# Patient Record
Sex: MALE
[De-identification: names, ages, dates, MRNs, and addresses within clinical notes are randomized per-mention and may not be internally consistent; named-entity substitution may affect disease eponyms.]

---

## 2019-08-06 ENCOUNTER — HOSPITAL ENCOUNTER (EMERGENCY)
Dept: HOSPITAL 5 - ED | Age: 34
Discharge: HOME | End: 2019-08-06
Payer: SELF-PAY

## 2019-08-06 VITALS — DIASTOLIC BLOOD PRESSURE: 83 MMHG | SYSTOLIC BLOOD PRESSURE: 127 MMHG

## 2019-08-06 DIAGNOSIS — B96.89: ICD-10-CM

## 2019-08-06 DIAGNOSIS — F17.200: ICD-10-CM

## 2019-08-06 DIAGNOSIS — Z79.899: ICD-10-CM

## 2019-08-06 DIAGNOSIS — J03.80: Primary | ICD-10-CM

## 2019-08-06 PROCEDURE — 87430 STREP A AG IA: CPT

## 2019-08-06 PROCEDURE — 87116 MYCOBACTERIA CULTURE: CPT

## 2019-08-06 PROCEDURE — 99283 EMERGENCY DEPT VISIT LOW MDM: CPT

## 2019-08-06 PROCEDURE — 96372 THER/PROPH/DIAG INJ SC/IM: CPT

## 2019-08-06 NOTE — EMERGENCY DEPARTMENT REPORT
ED General Adult HPI





- General


Chief complaint: Sore Throat


Stated complaint: SEVERE SORE THROAT


Time Seen by Provider: 08/06/19 16:54


Source: patient


Mode of arrival: Ambulatory


Limitations: No Limitations





- History of Present Illness


Initial comments: 





Patient is a 33-year-old -American male with no past medical history who 

presents to the ED with c/o acute onset persistent severe sore throat, 

dysphagia, diffuse body aches and pain, lack of appetite and generalized 

weakness for the last 3 days.  Patient states that he has not been able to eat 

anything because of severe sore throat especially in the last 24 hours.  Patient

also complains of fever, chills, headache, nasal and sinus congestion, shortness

of breath and cough.  Patient denies abdominal pain, diarrhea, dysuria, urinary 

frequency and urgency, change in vision, neck pain, palpitations, low back pain.


MD Complaint: Sore throat, fever, chills, body aches and pain


-: Sudden, days(s) (3)


Location: head, mouth


Radiation: non-radiation


Severity scale (0 -10): 8


Quality: aching, sharp


Consistency: constant


Improves with: none


Worsens with: none


Associated Symptoms: denies other symptoms, cough, fever/chills, headaches, loss

of appetite, malaise.  denies: confusion, chest pain, diaphoresis, 

nausea/vomiting, rash, seizure, shortness of breath, syncope, weakness


Treatments Prior to Arrival: NSAID





- Related Data


                                  Previous Rx's











 Medication  Instructions  Recorded  Last Taken  Type


 


Amoxicillin [Trimox CAP] 500 mg PO Q8H #30 capsule 08/06/19 Unknown Rx


 


Ibuprofen [Motrin] 600 mg PO Q8H PRN #24 tablet 08/06/19 Unknown Rx


 


Lidocaine Viscous 2% 10 ml PO Q4H PRN #120 ml 08/06/19 Unknown Rx


 


methylPREDNISolone [Medrol 4MG 4 mg PO DAILY #21 tab.ds.pk 08/06/19 Unknown Rx





DOSEPAK (21 tabs)]    











                                    Allergies











Allergy/AdvReac Type Severity Reaction Status Date / Time


 


No Known Allergies Allergy   Unverified 08/06/19 16:19














ED Review of Systems


ROS: 


Stated complaint: SEVERE SORE THROAT


Other details as noted in HPI





Constitutional: chills, fever, malaise


Eyes: denies: eye pain, eye discharge, vision change


ENT: throat pain, congestion.  denies: ear pain, dental pain, hearing loss, 

epistaxis


Respiratory: no symptoms reported, cough.  denies: shortness of breath, SOB with

exertion, wheezing


Cardiovascular: denies: chest pain, palpitations


Endocrine: no symptoms reported


Gastrointestinal: denies: abdominal pain, nausea, diarrhea


Genitourinary: denies: urgency, dysuria


Musculoskeletal: denies: back pain, joint swelling, arthralgia


Skin: denies: rash, lesions


Neurological: denies: headache, weakness, paresthesias


Psychiatric: denies: anxiety, depression


Hematological/Lymphatic: denies: easy bleeding, easy bruising





ED Past Medical Hx





- Past Medical History


Previous Medical History?: No





- Surgical History


Past Surgical History?: No





- Social History


Smoking Status: Current Every Day Smoker


Substance Use Type: None





- Medications


Home Medications: 


                                Home Medications











 Medication  Instructions  Recorded  Confirmed  Last Taken  Type


 


Amoxicillin [Trimox CAP] 500 mg PO Q8H #30 capsule 08/06/19  Unknown Rx


 


Ibuprofen [Motrin] 600 mg PO Q8H PRN #24 tablet 08/06/19  Unknown Rx


 


Lidocaine Viscous 2% 10 ml PO Q4H PRN #120 ml 08/06/19  Unknown Rx


 


methylPREDNISolone [Medrol 4MG 4 mg PO DAILY #21 tab.ds.pk 08/06/19  Unknown Rx





DOSEPAK (21 tabs)]     














ED Physical Exam





- General


Limitations: No Limitations


General appearance: alert, in no apparent distress





- Head


Head exam: Present: atraumatic, normocephalic, normal inspection





- Eye


Eye exam: Present: normal appearance, PERRL, EOMI.  Absent: scleral icterus, 

conjunctival injection, nystagmus, periorbital swelling, periorbital tenderness


Pupils: Present: normal accommodation





- ENT


ENT exam: Present: mucous membranes moist, TM's normal bilaterally, normal 

external ear exam, other (Grossly congested nasal passages; Erythematous swollen

tonsils and oropharynx)





- Neck


Neck exam: Present: normal inspection, full ROM, lymphadenopathy.  Absent: 

tenderness





- Respiratory


Respiratory exam: Present: normal lung sounds bilaterally.  Absent: respiratory 

distress, wheezes, rales, rhonchi, chest wall tenderness, accessory muscle use





- Cardiovascular


Cardiovascular Exam: Present: regular rate, normal rhythm, normal heart sounds. 

Absent: systolic murmur, diastolic murmur, rubs, gallop





- GI/Abdominal


GI/Abdominal exam: Present: soft, normal bowel sounds.  Absent: distended, 

tenderness, rebound, hyperactive bowel sounds, hypoactive bowel sounds, 

organomegaly





- Rectal


Rectal exam: Present: deferred





- Extremities Exam


Extremities exam: Present: normal inspection, full ROM, normal capillary refill





- Back Exam


Back exam: Present: normal inspection, full ROM.  Absent: CVA tenderness (L), 

muscle spasm, paraspinal tenderness





- Neurological Exam


Neurological exam: Present: alert, oriented X3, CN II-XII intact, normal gait, 

reflexes normal





- Psychiatric


Psychiatric exam: Present: normal affect, normal mood





- Skin


Skin exam: Present: warm, dry, intact, normal color.  Absent: rash





ED Course


                                   Vital Signs











  08/06/19





  16:52


 


Temperature 101.6 F H


 


Pulse Rate 93 H


 


Respiratory 18





Rate 


 


Blood Pressure 127/83


 


O2 Sat by Pulse 99





Oximetry 














- Reevaluation(s)


Reevaluation #1: 





08/06/19 20:57


This is 33-year-old male who presented to the ED with acute onset study a sore 

throat with dysphagia, nasal and sinus congestion, fever and chills and mild dry

cough for the last 3 days.  In the ED, patient patient is alert and oriented 3 

and is not in distress but appears to be in pain and is febrile.  Rapid strep 

test is negative.  Patient was treated for pain in the ED as well as fever.  

Patient also received Bicillin 1.2 million units intramuscular injection 

empirically as well as Decadron.  On reevaluation, patient's fever is well 

controlled with medications and patient is feeling better.  Patient is 

discharged home on medications and advised follow-up with his primary care 

physician in 5-7 days for reevaluation or return to the ED immediately if 

symptoms get worse.


08/06/19 20:59








ED Medical Decision Making





- Medical Decision Making





This is 33-year-old male who presented to the ED with acute onset study a sore 

throat with dysphagia, nasal and sinus congestion, fever and chills and mild dry

cough for the last 3 days.  In the ED, patient patient is alert and oriented 3 

and is not in distress but appears to be in pain and is febrile.  Rapid strep 

test is negative.  Patient was treated for pain in the ED as well as fever.  

Patient also received Bicillin 1.2 million units intramuscular injection 

empirically as well as Decadron.  On reevaluation, patient's fever is well 

controlled with medications and patient is feeling better.  Patient is 

discharged home on medications and advised follow-up with his primary care 

physician in 5-7 days for reevaluation or return to the ED immediately if 

symptoms get worse.





- Differential Diagnosis


Strep pharyngitis; fever and chills, acute URI


Critical care attestation.: 


If time is entered above; I have spent that time in minutes in the direct care 

of this critically ill patient, excluding procedure time.








ED Disposition


Clinical Impression: 


 Acute bacterial tonsillitis, Fever and chills, Acute upper respiratory 

infection





Acute pharyngitis


Qualifiers:


 Pharyngitis/tonsillitis etiology: other specified organisms Qualified Code(s): 

J02.8 - Acute pharyngitis due to other specified organisms





Disposition: DC-01 TO HOME OR SELFCARE


Is pt being admited?: No


Does the pt Need Aspirin: No


Condition: Stable


Instructions:  Pharyngitis (ED), Fever in Adults (ED), Upper Respiratory 

Infection (ED), Tonsillitis (ED)


Additional Instructions: 


Take medications with food, drink plenty of fluids and follow-up with your 

primary care physician in 5-7 days for reevaluation.  Return to the ED 

immediately if symptoms get worse.


Prescriptions: 


Lidocaine Viscous 2% 10 ml PO Q4H PRN #120 ml


 PRN Reason: Pain , Severe (7-10)


methylPREDNISolone [Medrol 4MG DOSEPAK (21 tabs)] 4 mg PO DAILY #21 tab.ds.pk


Ibuprofen [Motrin] 600 mg PO Q8H PRN #24 tablet


 PRN Reason: Pain


Amoxicillin [Trimox CAP] 500 mg PO Q8H #30 capsule


Referrals: 


[facility] [Outside] - 3-5 Days


Forms:  Work/School Release Form(ED)


Time of Disposition: 21:03


Print Language: ENGLISH

## 2019-08-06 NOTE — EVENT NOTE
ED Screening Note


Date of service: 08/06/19


Time: 16:54


ED Screening Note: 


32 y/o male comes in for sore throat for 36 hours.  Report fever and nausea. 





This initial assessment/diagnostic orders/clinical plan/treatment(s) is/are 

subject to change based on patients health status, clinical progression and re-

assessment by fellow clinical providers in the ED. Further treatment and workup 

at subsequent clinical providers discretion. Patient/guardian urged not to elope

from the ED as their condition may be serious if not clinically assessed and 

managed. 





Initial orders include: